# Patient Record
Sex: MALE | Race: WHITE | Employment: OTHER | URBAN - METROPOLITAN AREA
[De-identification: names, ages, dates, MRNs, and addresses within clinical notes are randomized per-mention and may not be internally consistent; named-entity substitution may affect disease eponyms.]

---

## 2018-03-28 ENCOUNTER — TRANSFERRED RECORDS (OUTPATIENT)
Dept: HEALTH INFORMATION MANAGEMENT | Facility: CLINIC | Age: 68
End: 2018-03-28

## 2018-04-19 ENCOUNTER — TRANSFERRED RECORDS (OUTPATIENT)
Dept: HEALTH INFORMATION MANAGEMENT | Facility: CLINIC | Age: 68
End: 2018-04-19

## 2018-05-22 ENCOUNTER — PRE VISIT (OUTPATIENT)
Dept: UROLOGY | Facility: CLINIC | Age: 68
End: 2018-05-22

## 2018-05-22 NOTE — TELEPHONE ENCOUNTER
MEDICAL RECORDS REQUEST   [facility]  Urology Clinic  [address] [place] [address]  PHONE: [phone]  Fax: [phone]        FUTURE VISIT INFORMATION                                                   Lasha Obando, : 1950 scheduled for future visit at [facility]    APPOINTMENT INFORMATION:    Date: 2018    Provider:  Matthew Brennan    Reason for Visit/Diagnosis: BPH    REFERRAL INFORMATION:    Referring provider:  KIMBERLEE MONROY    Specialty: PA    Referring providers clinic:  [facility]    Clinic contact number:  [phone]    RECORDS REQUESTED FOR VISIT                                                     NOTES  STATUS/DETAILS   OFFICE NOTE from referring provider  yes   OFFICE NOTE from other specialist  yes   DISCHARGE SUMMARY from hospital  no   DISCHARGE REPORT from the ER  no   OPERATIVE REPORT  no   MEDICATION LIST  yes       PRE-VISIT CHECKLIST      Record collection complete Yes  SENT TO SCANNING.. CDK   Appointment appropriately scheduled           (right time/right provider) Yes   Joint diagnostic appointment coordinated correctly          (ensure right order & amount of time) Yes   MyChart activation If no, please explain IN PROCESS   Questionnaire complete If no, please explain IN PROCESS     Completed by: Tracy Gauthier

## 2018-06-25 ENCOUNTER — PRE VISIT (OUTPATIENT)
Dept: UROLOGY | Facility: CLINIC | Age: 68
End: 2018-06-25

## 2018-06-26 ENCOUNTER — ALLIED HEALTH/NURSE VISIT (OUTPATIENT)
Dept: UROLOGY | Facility: CLINIC | Age: 68
End: 2018-06-26
Payer: COMMERCIAL

## 2018-06-26 ENCOUNTER — OFFICE VISIT (OUTPATIENT)
Dept: UROLOGY | Facility: CLINIC | Age: 68
End: 2018-06-26
Payer: COMMERCIAL

## 2018-06-26 VITALS
BODY MASS INDEX: 37.22 KG/M2 | DIASTOLIC BLOOD PRESSURE: 95 MMHG | WEIGHT: 290 LBS | HEART RATE: 63 BPM | SYSTOLIC BLOOD PRESSURE: 171 MMHG | HEIGHT: 74 IN

## 2018-06-26 DIAGNOSIS — N40.0 BENIGN PROSTATIC HYPERPLASIA, UNSPECIFIED WHETHER LOWER URINARY TRACT SYMPTOMS PRESENT: Primary | ICD-10-CM

## 2018-06-26 DIAGNOSIS — N40.0 BENIGN PROSTATIC HYPERPLASIA, UNSPECIFIED WHETHER LOWER URINARY TRACT SYMPTOMS PRESENT: ICD-10-CM

## 2018-06-26 LAB
ALBUMIN UR-MCNC: NEGATIVE MG/DL
ANION GAP SERPL CALCULATED.3IONS-SCNC: 8 MMOL/L (ref 3–14)
APPEARANCE UR: CLEAR
BILIRUB UR QL STRIP: NEGATIVE
BUN SERPL-MCNC: 17 MG/DL (ref 7–30)
CALCIUM SERPL-MCNC: 8.7 MG/DL (ref 8.5–10.1)
CHLORIDE SERPL-SCNC: 105 MMOL/L (ref 94–109)
CO2 SERPL-SCNC: 24 MMOL/L (ref 20–32)
COLOR UR AUTO: YELLOW
CREAT SERPL-MCNC: 0.94 MG/DL (ref 0.66–1.25)
ERYTHROCYTE [DISTWIDTH] IN BLOOD BY AUTOMATED COUNT: 12.1 % (ref 10–15)
GFR SERPL CREATININE-BSD FRML MDRD: 80 ML/MIN/1.7M2
GLUCOSE SERPL-MCNC: 96 MG/DL (ref 70–99)
GLUCOSE UR STRIP-MCNC: NEGATIVE MG/DL
HCT VFR BLD AUTO: 46.9 % (ref 40–53)
HGB BLD-MCNC: 15.4 G/DL (ref 13.3–17.7)
HGB UR QL STRIP: NEGATIVE
KETONES UR STRIP-MCNC: NEGATIVE MG/DL
LEUKOCYTE ESTERASE UR QL STRIP: ABNORMAL
MCH RBC QN AUTO: 29.5 PG (ref 26.5–33)
MCHC RBC AUTO-ENTMCNC: 32.8 G/DL (ref 31.5–36.5)
MCV RBC AUTO: 90 FL (ref 78–100)
MUCOUS THREADS #/AREA URNS LPF: PRESENT /LPF
NITRATE UR QL: NEGATIVE
PH UR STRIP: 6 PH (ref 5–7)
PLATELET # BLD AUTO: 204 10E9/L (ref 150–450)
POTASSIUM SERPL-SCNC: 4.3 MMOL/L (ref 3.4–5.3)
RBC # BLD AUTO: 5.22 10E12/L (ref 4.4–5.9)
RBC #/AREA URNS AUTO: 2 /HPF (ref 0–2)
SODIUM SERPL-SCNC: 137 MMOL/L (ref 133–144)
SOURCE: ABNORMAL
SP GR UR STRIP: 1.01 (ref 1–1.03)
SQUAMOUS #/AREA URNS AUTO: 1 /HPF (ref 0–1)
UROBILINOGEN UR STRIP-MCNC: 0 MG/DL (ref 0–2)
WBC # BLD AUTO: 8 10E9/L (ref 4–11)
WBC #/AREA URNS AUTO: 22 /HPF (ref 0–5)

## 2018-06-26 ASSESSMENT — ENCOUNTER SYMPTOMS
FLANK PAIN: 0
DYSURIA: 1
HEMATURIA: 1
DIFFICULTY URINATING: 0

## 2018-06-26 ASSESSMENT — PAIN SCALES - GENERAL: PAINLEVEL: NO PAIN (0)

## 2018-06-26 NOTE — PROGRESS NOTES
Service Date: 06/26/2018      REASON FOR VISIT TODAY:  Benign prostatic hypertrophy.      HISTORY OF PRESENT ILLNESS:  Mr. Obando is a 68-year-old gentleman who [facility].  The patient developed some flank pain on 03/28/2018 and was ultimately seen in the ER.  Imaging obtained at that time demonstrated a markedly enlarged bladder with the dome of the bladder reaching the hilar vessels.  The patient had bilateral hydronephrosis noted as well.  The patient was started on Flomax at that time and began clean intermittent catheterization.  He has been performing intermittent catheterization now 3 times daily since then and has been doing so without difficulty.  The patient notes that he initially was having postvoid residuals around 500 mL has not checked the volumes recently but notes he continues to have significant amounts of urine when he catheterizes.        Prior to the patient's presentation in the ER, the patient did have to push and strain to empty his bladder.  He did have sensations of incomplete emptying as well as significant urge and urge incontinence at times.  The patient had tried CIC in the past for a small period of time but had difficulty with it at that time and then stopped.  The patient notes he had not tried any medications for his urination prior to taking Flomax for 1 month after his visit to the ER in March.  He is not currently taking any medications for urination.      PAST MEDICAL HISTORY:  Significant for an episode of CHF, hypertension, gout, umbilical hernia.      PAST SURGICAL HISTORY:  Includes a left inguinal hernia repair and ear surgeries.      MEDICATIONS:  Carvedilol, losartan, hydralazine.      ALLERGIES:  No known drug allergies.      FAMILY HISTORY:  Negative for urologic malignancies.      SOCIAL HISTORY:  The patient smoked tobacco for 12 years and quit in 1992.  No alcohol.      REVIEW OF SYSTEMS:  Negative for  fevers, chills, sweats, nausea, vomiting, unexplained weight changes.      PHYSICAL EXAMINATION:   VITAL SIGNS:  Blood pressure is 171/95, pulse 63.   GENERAL:  He is in no acute distress.   ABDOMEN:  Obese with a moderately large umbilical hernia protruding out which is nontender.  There is no flank tenderness to percussion.   GENITALIA:  He has a circumcised phallus, normal meatus.  Testes are descended bilaterally.  The right testicle is somewhat larger than the left, consistent with a hydrocele.  There were no palpable abnormalities in the testes.  No evidence of inguinal hernias.   RECTAL:  Digital rectal exam was limited by body habitus, but the apex of the prostate was smooth and without nodularity.      The patient has a CT scan as noted above from 03/28/2018.  Followup ultrasound on 04/19/2018 showing no hydronephrosis, having resolved after the patient began doing CIC.  The patient's PSAs include a value of 4.12 on 01/20/2015, 4.34 on 08/07/2015, 6.04 on 01/11/2016 and 5.07 on 05/14/2018.      ASSESSMENT AND PLAN:  Over half of today's 45-minute visit was spent counseling the patient regarding his BPH and lower urinary tract symptoms.  I suggested to Mr. Obando that at this point in time he would have options including continued observation with CIC versus starting medications such as Flomax and finasteride which would actually shrink the prostate after taking the medication for 6 months or considering surgical intervention in the form of either a transurethral resection of the prostate or some version such as GreenLight laser photovaporization of the prostate versus simple prostatectomy versus HoLEP.  The risks and benefits of the various procedures were discussed in detail with Mr. Obando with an emphasis on a HoLEP procedure given the large size of the patient's prostate.  We discussed risks of the procedure including urinary incontinence, erectile dysfunction, development of urgency, frequency as well as  possible bladder neck contractures following the procedure.  The patient notes that he thinks at this point in time he would like to go forward with the HoLEP procedure.  I have had the patient meet with Dr. Lawrence today in clinic as well as Dr. Lawrence would likely be involved in the patient's procedure as well.  Mr. Obando and his wife asked many good questions today and these were answered to their satisfaction.  We look forward to seeing the patient in the operating room in the near future.         DEJUAN PORTER MD             D: 2018   T: 2018   MT: alee      Name:     JESUS OBANDO   MRN:      [id number]        Account:      [id number]   :      1950           Service Date: 2018      Document: [id number]

## 2018-06-26 NOTE — LETTER
6/26/2018     RE: Lasha Obando  [address] [place] [address]     Dear Colleague,    Thank you for referring your patient, Lasha Obando, to the [facility] UROLOGY AND INST FOR PROSTATE AND UROLOGIC CANCERS at [facility]. Please see a copy of my visit note below.    Service Date: 06/26/2018      REASON FOR VISIT TODAY:  Benign prostatic hypertrophy.      HISTORY OF PRESENT ILLNESS:  Mr. Obando is a 68-year-old gentleman who [facility].  The patient developed some flank pain on 03/28/2018 and was ultimately seen in the ER.  Imaging obtained at that time demonstrated a markedly enlarged bladder with the dome of the bladder reaching the hilar vessels.  The patient had bilateral hydronephrosis noted as well.  The patient was started on Flomax at that time and began clean intermittent catheterization.  He has been performing intermittent catheterization now 3 times daily since then and has been doing so without difficulty.  The patient notes that he initially was having postvoid residuals around 500 mL has not checked the volumes recently but notes he continues to have significant amounts of urine when he catheterizes.        Prior to the patient's presentation in the ER, the patient did have to push and strain to empty his bladder.  He did have sensations of incomplete emptying as well as significant urge and urge incontinence at times.  The patient had tried CIC in the past for a small period of time but had difficulty with it at that time and then stopped.  The patient notes he had not tried any medications for his urination prior to taking Flomax for 1 month after his visit to the ER in March.  He is not currently taking any medications for urination.      PAST MEDICAL HISTORY:  Significant for an episode of CHF, hypertension, gout, umbilical hernia.      PAST SURGICAL HISTORY:  Includes a left inguinal hernia  repair and ear surgeries.      MEDICATIONS:  Carvedilol, losartan, hydralazine.      ALLERGIES:  No known drug allergies.      FAMILY HISTORY:  Negative for urologic malignancies.      SOCIAL HISTORY:  The patient smoked tobacco for 12 years and quit in 1992.  No alcohol.      REVIEW OF SYSTEMS:  Negative for fevers, chills, sweats, nausea, vomiting, unexplained weight changes.      PHYSICAL EXAMINATION:   VITAL SIGNS:  Blood pressure is 171/95, pulse 63.   GENERAL:  He is in no acute distress.   ABDOMEN:  Obese with a moderately large umbilical hernia protruding out which is nontender.  There is no flank tenderness to percussion.   GENITALIA:  He has a circumcised phallus, normal meatus.  Testes are descended bilaterally.  The right testicle is somewhat larger than the left, consistent with a hydrocele.  There were no palpable abnormalities in the testes.  No evidence of inguinal hernias.   RECTAL:  Digital rectal exam was limited by body habitus, but the apex of the prostate was smooth and without nodularity.      The patient has a CT scan as noted above from 03/28/2018.  Followup ultrasound on 04/19/2018 showing no hydronephrosis, having resolved after the patient began doing CIC.  The patient's PSAs include a value of 4.12 on 01/20/2015, 4.34 on 08/07/2015, 6.04 on 01/11/2016 and 5.07 on 05/14/2018.      ASSESSMENT AND PLAN:  Over half of today's 45-minute visit was spent counseling the patient regarding his BPH and lower urinary tract symptoms.  I suggested to Mr. Obando that at this point in time he would have options including continued observation with CIC versus starting medications such as Flomax and finasteride which would actually shrink the prostate after taking the medication for 6 months or considering surgical intervention in the form of either a transurethral resection of the prostate or some version such as GreenLight laser photovaporization of the prostate versus simple prostatectomy versus HoLEP.   The risks and benefits of the various procedures were discussed in detail with Mr. Obando with an emphasis on a HoLEP procedure given the large size of the patient's prostate.  We discussed risks of the procedure including urinary incontinence, erectile dysfunction, development of urgency, frequency as well as possible bladder neck contractures following the procedure.  The patient notes that he thinks at this point in time he would like to go forward with the HoLEP procedure.  I have had the patient meet with Dr. Lawrence today in clinic as well as Dr. Lawrence would likely be involved in the patient's procedure as well.  Mr. Obando and his wife asked many good questions today and these were answered to their satisfaction.  We look forward to seeing the patient in the operating room in the near future.         DEJUAN PORTER MD

## 2018-06-26 NOTE — NURSING NOTE
Pre Op Teaching Flowsheet       Pre and Post op Patient Education  Relevant Diagnosis:  BPH      Motivation Level:  Asks Questions: Yes  Eager to Learn:  Yes  Cooperative: Yes  Receptive (willing/able to accept information):  Yes  Patient demonstrates understanding of the following:  Date and time of surgery:  July 12th  [facility]  History and Physical and any other testing necessary prior to surgery: [facility]  Required time line for completion of History and Physical and any pre-op testing: Yes    NPO Guidelines: Nothing to eat 8 hours prior to surgery. Can have clear liquids up to 2 hours prior to sugery    Patient demonstrates understanding of the following:  Pre-op bowel prep: N/A  Pre-op showering/scrub information with Hibiclens Soap: Yes  Medications to take the day of surgery:  Per PCP  Blood thinner medications discussed and when to stop (if applicable):  Yes  Diabetes medication management (if applicable):  N/A  Discussed pain control after surgery: pain scale, pain medications and pain management techniques  Infection Prevention: Patient demonstrates understanding of the following:  Patient instructed on hand hygiene:  Yes  Surgical procedure site care taught: N/A  Signs and symptoms of infection taught:  Yes  Wound care will be taught at the time of discharge.  Central venous catheter care will be taught at the time of discharge (if applicable).    Post-op follow-up:  Discussed how to contact the hospital, nurse, and clinic scheduling staff if necessary.    Instructional materials used/given/mailed:  [facility] Booklet, post op teaching sheet, Map, Soap, and arrival/location information.    Surgical instructions given to patient in clinic: Yes.    Instructional Materials given:  Before your surgery packet , Medications to avoid before surgery , Showering or Bathing instructions before surgery  and What to expect after surgery  Total time  with patient: 10 minutes    Corrine James RN

## 2018-06-26 NOTE — MR AVS SNAPSHOT
After Visit Summary   6/26/2018    Lasha Obando    MRN: [id number]           Patient Information     Date Of Birth          1950        Visit Information        Provider Department      6/26/2018 12:00 PM Matthew Brennan MD [facility] and [facility]rologic [facility]        Today's Diagnoses     Benign prostatic hyperplasia, unspecified whether lower urinary tract symptoms present    -  1       Follow-ups after your visit        Additional Services     PAC Visit Referral (For [facility] Only)       Does this visit require an Anesthesia consult?  Yes - Evaluate for medical necessity related to one of the following conditions:  Elevated cardiopulmonary risk    H&P done by:  PCP      Please be aware that coverage of these services is subject to the terms and limitations of your health insurance plan.  Call member services at your health plan with any benefit or coverage questions.      Please bring the following to your appointment:  >>   Any x-rays, CTs or MRIs which have been performed.  Contact the facility where they were done to arrange for  prior to your scheduled appointment.  Any new CT, MRI or other procedures ordered by your specialist must be performed at a [place] facility or coordinated by your clinic's referral office.    >>   List of current medications  >>   This referral request   >>   Any documents/labs given to you for this referral                  Your next 10 appointments already scheduled     Jul 12, 2018   Procedure with [place] Lawrence MD   [facility], [place], Same Day Surgery (--)    [address] [address] [phone]              Who to contact     Please call your clinic at [phone] to:    Ask questions about your health    Make or cancel appointments    Discuss your medicines    Learn about your test results    Speak to your doctor            Additional Information About Your Visit        MyChart Information      "[employer] gives you secure access to your electronic health record. If you see a primary care provider, you can also send messages to your care team and make appointments. If you have questions, please call your primary care clinic.  If you do not have a primary care provider, please call [phone] and they will assist you.      [employer] is an electronic gateway that provides easy, online access to your medical records. With [employer], you can request a clinic appointment, read your test results, renew a prescription or communicate with your care team.     To access your existing account, please contact your [facility] or call [phone] for assistance.        Care EveryWhere ID     This is your Care EveryWhere ID. This could be used by other organizations to access your [place] medical records  [id number]        Your Vitals Were     Pulse Height BMI (Body Mass Index)             63 1.88 m (6' 2\") 37.23 kg/m2          Blood Pressure from Last 3 Encounters:   No data found for BP    Weight from Last 3 Encounters:   No data found for Wt              Today, you had the following     No orders found for display       Primary Care Provider    None Specified       No primary provider on file.        Equal Access to Services     [facility] Center[facility] : Hadii sukh Love, sherron saucedo, gautam yee, lupe sow . So [facility] [phone].    ATENCIÓN: Si habla español, tiene a hunt disposición servicios gratuitos de asistencia lingüística. Llame al [phone].    We comply with applicable federal civil rights laws and [place] laws. We do not discriminate on the basis of race, color, national origin, age, disability, sex, sexual orientation, or gender identity.            Thank you!     Thank you for choosing [facility] UROLOGY AND [facility] FOR PROSTATE AND UROLOGIC CANCERS  for your care. Our goal is always to provide you with excellent care. Hearing " back from our patients is one way we can continue to improve our services. Please take a few minutes to complete the written survey that you may receive in the mail after your visit with us. Thank you!             Your Updated Medication List - Protect others around you: Learn how to safely use, store and throw away your medicines at www.disposemymeds.org.      Notice  As of 6/26/2018 11:59 PM    You have not been prescribed any medications.

## 2018-06-26 NOTE — MR AVS SNAPSHOT
After Visit Summary   6/26/2018    Lasha Obando    MRN: [id number]           Patient Information     Date Of Birth          1950        Visit Information        Provider Department      6/26/2018 2:00 PM Nurse, Jos[facility] and [facility] for Prostate and Urologic Cancers        Today's Diagnoses     Benign prostatic hyperplasia, unspecified whether lower urinary tract symptoms present    -  1       Follow-ups after your visit        Future tests that were ordered for you today     Open Future Orders        Priority Expected Expires Ordered    CBC with platelets Routine  6/26/2019 6/26/2018    Basic metabolic panel Routine  6/26/2019 6/26/2018            Who to contact     Please call your clinic at [phone] to:    Ask questions about your health    Make or cancel appointments    Discuss your medicines    Learn about your test results    Speak to your doctor            Additional Information About Your Visit        [employer] Information     [employer] gives you secure access to your electronic health record. If you see a primary care provider, you can also send messages to your care team and make appointments. If you have questions, please call your primary care clinic.  If you do not have a primary care provider, please call [phone] and they will assist you.      [employer] is an electronic gateway that provides easy, online access to your medical records. With [employer], you can request a clinic appointment, read your test results, renew a prescription or communicate with your care team.     To access your existing account, please contact your [facility] or call [phone] for assistance.        Care EveryWhere ID     This is your Care EveryWhere ID. This could be used by other organizations to access your [place] medical records  [id number]         Blood Pressure from Last 3 Encounters:   06/26/18 (!) 171/95    Weight from Last 3  Encounters:   06/26/18 131.5 kg (290 lb)              Today, you had the following     No orders found for display       Primary Care Provider    None Specified       No primary provider on file.        Equal Access to Services     PENNY GIORDANO : Hadii aad ku hadgueralucia Love, philipjac oropezalukasha, gautam yee, lupe angélicain hayaataryn joedavid giordano laWilbertdennis weller. So [facility] [phone].    ATENCIÓN: Si habla español, tiene a hunt disposición servicios gratuitos de asistencia lingüística. Llame al [phone].    We comply with applicable federal civil rights laws and [place] laws. We do not discriminate on the basis of race, color, national origin, age, disability, sex, sexual orientation, or gender identity.            Thank you!     Thank you for choosing [facility] UROLOGY AND [facility] FOR PROSTATE AND UROLOGIC CANCERS  for your care. Our goal is always to provide you with excellent care. Hearing back from our patients is one way we can continue to improve our services. Please take a few minutes to complete the written survey that you may receive in the mail after your visit with us. Thank you!             Your Updated Medication List - Protect others around you: Learn how to safely use, store and throw away your medicines at www.disposemymeds.org.      Notice  As of 6/26/2018  2:24 PM    You have not been prescribed any medications.

## 2018-06-28 LAB
BACTERIA SPEC CULT: ABNORMAL
Lab: ABNORMAL
SPECIMEN SOURCE: ABNORMAL

## 2018-07-03 ENCOUNTER — CARE COORDINATION (OUTPATIENT)
Dept: UROLOGY | Facility: CLINIC | Age: 68
End: 2018-07-03

## 2018-07-03 DIAGNOSIS — R30.0 DYSURIA: Primary | ICD-10-CM

## 2018-07-03 RX ORDER — CIPROFLOXACIN 500 MG/1
500 TABLET, FILM COATED ORAL 2 TIMES DAILY
Qty: 10 TABLET | Refills: 0 | Status: SHIPPED | OUTPATIENT
Start: 2018-07-03 | End: 2018-07-09

## 2018-07-03 NOTE — PROGRESS NOTES
Spoke with patient to let him know to start taking Cipro 5 days prior to upcoming surgery. RX sent to [facility]. Patient agrees with plan. Corrine James RN

## 2018-07-09 ENCOUNTER — TELEPHONE (OUTPATIENT)
Dept: UROLOGY | Facility: CLINIC | Age: 68
End: 2018-07-09

## 2018-07-09 DIAGNOSIS — R30.0 DYSURIA: Primary | ICD-10-CM

## 2018-07-09 RX ORDER — NITROFURANTOIN 25; 75 MG/1; MG/1
100 CAPSULE ORAL 2 TIMES DAILY
Qty: 10 CAPSULE | Refills: 0 | Status: SHIPPED | OUTPATIENT
Start: 2018-07-09 | End: 2018-07-10

## 2018-07-09 NOTE — TELEPHONE ENCOUNTER
Patient was started on Cipro on 7/7/18.  Patient states he took 1 dose and had some difficulty breathing and sweating.  He didn't take any other doses and he has returned to baseline.    Discussed with Dr Lawrence. Will switch to nitrofurantoin BID.    I attempted to update his allergies but he didn't have them available at the time of the call.    He will bring his full list to surgery on 7/12/18 to update his chart.    RX sent to patient's pharmacy.      Karina Martines, RN, BSN  Urology Patient Care Supervisor

## 2018-07-10 ENCOUNTER — CARE COORDINATION (OUTPATIENT)
Dept: UROLOGY | Facility: CLINIC | Age: 68
End: 2018-07-10

## 2018-07-10 DIAGNOSIS — R30.0 DYSURIA: Primary | ICD-10-CM

## 2018-07-10 RX ORDER — CETIRIZINE HYDROCHLORIDE 10 MG/1
10 TABLET ORAL DAILY
COMMUNITY
End: 2018-07-10

## 2018-07-10 RX ORDER — VALSARTAN 160 MG/1
160 TABLET ORAL 2 TIMES DAILY
COMMUNITY

## 2018-07-10 RX ORDER — DOXYCYCLINE 100 MG/1
100 CAPSULE ORAL 2 TIMES DAILY
Qty: 14 CAPSULE | Refills: 0 | Status: ON HOLD | OUTPATIENT
Start: 2018-07-10 | End: 2018-07-13

## 2018-07-10 RX ORDER — HYDRALAZINE HYDROCHLORIDE 25 MG/1
25 TABLET, FILM COATED ORAL 3 TIMES DAILY
COMMUNITY

## 2018-07-10 RX ORDER — CARVEDILOL 25 MG/1
25 TABLET ORAL 2 TIMES DAILY WITH MEALS
COMMUNITY

## 2018-07-10 NOTE — PROGRESS NOTES
Patient called me this morning to let me know that he also had a reaction to macrobid. Palpitations, SOB . I will list it as an allergy. Dr. Lawrence switched to doxycline 100mg BID. Patient notified and he will pick it up and start taking it today in preparation for his procedure this Thursday. Corrine James RN

## 2018-07-12 ENCOUNTER — ANESTHESIA (OUTPATIENT)
Dept: SURGERY | Facility: CLINIC | Age: 68
End: 2018-07-12
Payer: COMMERCIAL

## 2018-07-12 ENCOUNTER — SURGERY (OUTPATIENT)
Age: 68
End: 2018-07-12

## 2018-07-12 ENCOUNTER — HOSPITAL ENCOUNTER (OUTPATIENT)
Facility: CLINIC | Age: 68
Setting detail: OBSERVATION
Discharge: HOME | End: 2018-07-13
Attending: UROLOGY | Admitting: UROLOGY
Payer: COMMERCIAL

## 2018-07-12 ENCOUNTER — ANESTHESIA EVENT (OUTPATIENT)
Dept: SURGERY | Facility: CLINIC | Age: 68
End: 2018-07-12
Payer: COMMERCIAL

## 2018-07-12 DIAGNOSIS — G89.18 POST-OP PAIN: Primary | ICD-10-CM

## 2018-07-12 DIAGNOSIS — R30.0 DYSURIA: ICD-10-CM

## 2018-07-12 PROBLEM — N40.1 PROSTATE HYPERPLASIA WITH URINARY OBSTRUCTION: Status: ACTIVE | Noted: 2018-07-12

## 2018-07-12 PROBLEM — N13.8 PROSTATE HYPERPLASIA WITH URINARY OBSTRUCTION: Status: ACTIVE | Noted: 2018-07-12

## 2018-07-12 LAB
ABO + RH BLD: NORMAL
ABO + RH BLD: NORMAL
BLD GP AB SCN SERPL QL: NORMAL
BLOOD BANK CMNT PATIENT-IMP: NORMAL
ERYTHROCYTE [DISTWIDTH] IN BLOOD BY AUTOMATED COUNT: 12.5 % (ref 10–15)
GLUCOSE BLDC GLUCOMTR-MCNC: 119 MG/DL (ref 70–99)
GLUCOSE SERPL-MCNC: 116 MG/DL (ref 70–99)
HCT VFR BLD AUTO: 46.6 % (ref 40–53)
HGB BLD-MCNC: 15.1 G/DL (ref 13.3–17.7)
MCH RBC QN AUTO: 29.4 PG (ref 26.5–33)
MCHC RBC AUTO-ENTMCNC: 32.4 G/DL (ref 31.5–36.5)
MCV RBC AUTO: 91 FL (ref 78–100)
PLATELET # BLD AUTO: 171 10E9/L (ref 150–450)
POTASSIUM SERPL-SCNC: 4.4 MMOL/L (ref 3.4–5.3)
RBC # BLD AUTO: 5.14 10E12/L (ref 4.4–5.9)
SPECIMEN EXP DATE BLD: NORMAL
WBC # BLD AUTO: 7.6 10E9/L (ref 4–11)

## 2018-07-12 PROCEDURE — 84132 ASSAY OF SERUM POTASSIUM: CPT | Performed by: ANESTHESIOLOGY

## 2018-07-12 PROCEDURE — 82947 ASSAY GLUCOSE BLOOD QUANT: CPT | Performed by: ANESTHESIOLOGY

## 2018-07-12 PROCEDURE — 86850 RBC ANTIBODY SCREEN: CPT | Performed by: UROLOGY

## 2018-07-12 PROCEDURE — P9041 ALBUMIN (HUMAN),5%, 50ML: HCPCS | Performed by: NURSE ANESTHETIST, CERTIFIED REGISTERED

## 2018-07-12 PROCEDURE — 25000128 H RX IP 250 OP 636: Performed by: NURSE ANESTHETIST, CERTIFIED REGISTERED

## 2018-07-12 PROCEDURE — 25000566 ZZH SEVOFLURANE, EA 15 MIN: Performed by: UROLOGY

## 2018-07-12 PROCEDURE — 36415 COLL VENOUS BLD VENIPUNCTURE: CPT | Performed by: STUDENT IN AN ORGANIZED HEALTH CARE EDUCATION/TRAINING PROGRAM

## 2018-07-12 PROCEDURE — 36000064 ZZH SURGERY LEVEL 4 EA 15 ADDTL MIN - UMMC: Performed by: UROLOGY

## 2018-07-12 PROCEDURE — C9399 UNCLASSIFIED DRUGS OR BIOLOG: HCPCS | Performed by: NURSE ANESTHETIST, CERTIFIED REGISTERED

## 2018-07-12 PROCEDURE — 85027 COMPLETE CBC AUTOMATED: CPT | Performed by: STUDENT IN AN ORGANIZED HEALTH CARE EDUCATION/TRAINING PROGRAM

## 2018-07-12 PROCEDURE — 36000062 ZZH SURGERY LEVEL 4 1ST 30 MIN - UMMC: Performed by: UROLOGY

## 2018-07-12 PROCEDURE — 86900 BLOOD TYPING SEROLOGIC ABO: CPT | Performed by: UROLOGY

## 2018-07-12 PROCEDURE — 86901 BLOOD TYPING SEROLOGIC RH(D): CPT | Performed by: UROLOGY

## 2018-07-12 PROCEDURE — 71000014 ZZH RECOVERY PHASE 1 LEVEL 2 FIRST HR: Performed by: UROLOGY

## 2018-07-12 PROCEDURE — 25800025 ZZH RX 258: Performed by: STUDENT IN AN ORGANIZED HEALTH CARE EDUCATION/TRAINING PROGRAM

## 2018-07-12 PROCEDURE — 88305 TISSUE EXAM BY PATHOLOGIST: CPT | Mod: 26 | Performed by: UROLOGY

## 2018-07-12 PROCEDURE — 40000170 ZZH STATISTIC PRE-PROCEDURE ASSESSMENT II: Performed by: UROLOGY

## 2018-07-12 PROCEDURE — 25000125 ZZHC RX 250: Performed by: NURSE ANESTHETIST, CERTIFIED REGISTERED

## 2018-07-12 PROCEDURE — 00000146 ZZHCL STATISTIC GLUCOSE BY METER IP

## 2018-07-12 PROCEDURE — 88305 TISSUE EXAM BY PATHOLOGIST: CPT | Performed by: UROLOGY

## 2018-07-12 PROCEDURE — 37000008 ZZH ANESTHESIA TECHNICAL FEE, 1ST 30 MIN: Performed by: UROLOGY

## 2018-07-12 PROCEDURE — 27210794 ZZH OR GENERAL SUPPLY STERILE: Performed by: UROLOGY

## 2018-07-12 PROCEDURE — 25000128 H RX IP 250 OP 636: Performed by: UROLOGY

## 2018-07-12 PROCEDURE — 37000009 ZZH ANESTHESIA TECHNICAL FEE, EACH ADDTL 15 MIN: Performed by: UROLOGY

## 2018-07-12 PROCEDURE — 36415 COLL VENOUS BLD VENIPUNCTURE: CPT | Performed by: UROLOGY

## 2018-07-12 PROCEDURE — G0378 HOSPITAL OBSERVATION PER HR: HCPCS

## 2018-07-12 PROCEDURE — C1758 CATHETER, URETERAL: HCPCS | Performed by: UROLOGY

## 2018-07-12 RX ORDER — ALBUTEROL SULFATE 0.83 MG/ML
2.5 SOLUTION RESPIRATORY (INHALATION) EVERY 4 HOURS PRN
Status: DISCONTINUED | OUTPATIENT
Start: 2018-07-12 | End: 2018-07-12 | Stop reason: HOSPADM

## 2018-07-12 RX ORDER — ONDANSETRON 2 MG/ML
4 INJECTION INTRAMUSCULAR; INTRAVENOUS EVERY 30 MIN PRN
Status: DISCONTINUED | OUTPATIENT
Start: 2018-07-12 | End: 2018-07-12 | Stop reason: HOSPADM

## 2018-07-12 RX ORDER — CARVEDILOL 25 MG/1
25 TABLET ORAL 2 TIMES DAILY WITH MEALS
Status: DISCONTINUED | OUTPATIENT
Start: 2018-07-12 | End: 2018-07-13 | Stop reason: HOSPADM

## 2018-07-12 RX ORDER — EPHEDRINE SULFATE 50 MG/ML
INJECTION, SOLUTION INTRAMUSCULAR; INTRAVENOUS; SUBCUTANEOUS PRN
Status: DISCONTINUED | OUTPATIENT
Start: 2018-07-12 | End: 2018-07-12

## 2018-07-12 RX ORDER — FENTANYL CITRATE 50 UG/ML
INJECTION, SOLUTION INTRAMUSCULAR; INTRAVENOUS PRN
Status: DISCONTINUED | OUTPATIENT
Start: 2018-07-12 | End: 2018-07-12

## 2018-07-12 RX ORDER — SODIUM CHLORIDE, SODIUM LACTATE, POTASSIUM CHLORIDE, CALCIUM CHLORIDE 600; 310; 30; 20 MG/100ML; MG/100ML; MG/100ML; MG/100ML
INJECTION, SOLUTION INTRAVENOUS CONTINUOUS
Status: DISCONTINUED | OUTPATIENT
Start: 2018-07-12 | End: 2018-07-12 | Stop reason: HOSPADM

## 2018-07-12 RX ORDER — VANCOMYCIN HYDROCHLORIDE 1 G/200ML
1000 INJECTION, SOLUTION INTRAVENOUS EVERY 12 HOURS
Status: DISCONTINUED | OUTPATIENT
Start: 2018-07-12 | End: 2018-07-12 | Stop reason: HOSPADM

## 2018-07-12 RX ORDER — LIDOCAINE HYDROCHLORIDE 20 MG/ML
INJECTION, SOLUTION INFILTRATION; PERINEURAL PRN
Status: DISCONTINUED | OUTPATIENT
Start: 2018-07-12 | End: 2018-07-12

## 2018-07-12 RX ORDER — ONDANSETRON 2 MG/ML
INJECTION INTRAMUSCULAR; INTRAVENOUS PRN
Status: DISCONTINUED | OUTPATIENT
Start: 2018-07-12 | End: 2018-07-12

## 2018-07-12 RX ORDER — VANCOMYCIN HYDROCHLORIDE 1 G/200ML
1000 INJECTION, SOLUTION INTRAVENOUS EVERY 12 HOURS
Status: DISCONTINUED | OUTPATIENT
Start: 2018-07-12 | End: 2018-07-12 | Stop reason: DRUGHIGH

## 2018-07-12 RX ORDER — OXYCODONE HYDROCHLORIDE 5 MG/1
5 TABLET ORAL EVERY 6 HOURS PRN
Qty: 20 TABLET | Refills: 0 | Status: SHIPPED | OUTPATIENT
Start: 2018-07-12 | End: 2018-07-12

## 2018-07-12 RX ORDER — ALBUMIN, HUMAN INJ 5% 5 %
SOLUTION INTRAVENOUS CONTINUOUS PRN
Status: DISCONTINUED | OUTPATIENT
Start: 2018-07-12 | End: 2018-07-12

## 2018-07-12 RX ORDER — SODIUM CHLORIDE, SODIUM LACTATE, POTASSIUM CHLORIDE, CALCIUM CHLORIDE 600; 310; 30; 20 MG/100ML; MG/100ML; MG/100ML; MG/100ML
INJECTION, SOLUTION INTRAVENOUS CONTINUOUS PRN
Status: DISCONTINUED | OUTPATIENT
Start: 2018-07-12 | End: 2018-07-12

## 2018-07-12 RX ORDER — HYDROMORPHONE HYDROCHLORIDE 1 MG/ML
.3-.5 INJECTION, SOLUTION INTRAMUSCULAR; INTRAVENOUS; SUBCUTANEOUS EVERY 10 MIN PRN
Status: DISCONTINUED | OUTPATIENT
Start: 2018-07-12 | End: 2018-07-12 | Stop reason: HOSPADM

## 2018-07-12 RX ORDER — NALOXONE HYDROCHLORIDE 0.4 MG/ML
.1-.4 INJECTION, SOLUTION INTRAMUSCULAR; INTRAVENOUS; SUBCUTANEOUS
Status: DISCONTINUED | OUTPATIENT
Start: 2018-07-12 | End: 2018-07-13 | Stop reason: HOSPADM

## 2018-07-12 RX ORDER — NALOXONE HYDROCHLORIDE 0.4 MG/ML
.1-.4 INJECTION, SOLUTION INTRAMUSCULAR; INTRAVENOUS; SUBCUTANEOUS
Status: DISCONTINUED | OUTPATIENT
Start: 2018-07-12 | End: 2018-07-12 | Stop reason: HOSPADM

## 2018-07-12 RX ORDER — FENTANYL CITRATE 50 UG/ML
25-50 INJECTION, SOLUTION INTRAMUSCULAR; INTRAVENOUS
Status: DISCONTINUED | OUTPATIENT
Start: 2018-07-12 | End: 2018-07-12 | Stop reason: HOSPADM

## 2018-07-12 RX ORDER — ONDANSETRON 4 MG/1
4 TABLET, ORALLY DISINTEGRATING ORAL EVERY 6 HOURS PRN
Status: DISCONTINUED | OUTPATIENT
Start: 2018-07-12 | End: 2018-07-13 | Stop reason: HOSPADM

## 2018-07-12 RX ORDER — OXYCODONE HYDROCHLORIDE 5 MG/1
5-10 TABLET ORAL
Status: DISCONTINUED | OUTPATIENT
Start: 2018-07-12 | End: 2018-07-13 | Stop reason: HOSPADM

## 2018-07-12 RX ORDER — DEXAMETHASONE SODIUM PHOSPHATE 4 MG/ML
4 INJECTION, SOLUTION INTRA-ARTICULAR; INTRALESIONAL; INTRAMUSCULAR; INTRAVENOUS; SOFT TISSUE EVERY 10 MIN PRN
Status: DISCONTINUED | OUTPATIENT
Start: 2018-07-12 | End: 2018-07-12 | Stop reason: HOSPADM

## 2018-07-12 RX ORDER — HYDROMORPHONE HCL/0.9% NACL/PF 0.2MG/0.2
0.2 SYRINGE (ML) INTRAVENOUS
Status: DISCONTINUED | OUTPATIENT
Start: 2018-07-12 | End: 2018-07-13 | Stop reason: HOSPADM

## 2018-07-12 RX ORDER — OXYCODONE HYDROCHLORIDE 5 MG/1
10 TABLET ORAL EVERY 4 HOURS PRN
Status: DISCONTINUED | OUTPATIENT
Start: 2018-07-12 | End: 2018-07-12

## 2018-07-12 RX ORDER — OXYCODONE HYDROCHLORIDE 5 MG/1
5-10 TABLET ORAL
Qty: 20 TABLET | Refills: 0 | Status: SHIPPED | OUTPATIENT
Start: 2018-07-12 | End: 2018-07-12

## 2018-07-12 RX ORDER — DEXAMETHASONE SODIUM PHOSPHATE 4 MG/ML
INJECTION, SOLUTION INTRA-ARTICULAR; INTRALESIONAL; INTRAMUSCULAR; INTRAVENOUS; SOFT TISSUE PRN
Status: DISCONTINUED | OUTPATIENT
Start: 2018-07-12 | End: 2018-07-12

## 2018-07-12 RX ORDER — HYDRALAZINE HYDROCHLORIDE 20 MG/ML
2.5-5 INJECTION INTRAMUSCULAR; INTRAVENOUS EVERY 10 MIN PRN
Status: DISCONTINUED | OUTPATIENT
Start: 2018-07-12 | End: 2018-07-12 | Stop reason: HOSPADM

## 2018-07-12 RX ORDER — OXYCODONE HYDROCHLORIDE 5 MG/1
5-10 TABLET ORAL
Qty: 20 TABLET | Refills: 0 | Status: SHIPPED | OUTPATIENT
Start: 2018-07-12 | End: 2018-09-11

## 2018-07-12 RX ORDER — PROPOFOL 10 MG/ML
INJECTION, EMULSION INTRAVENOUS PRN
Status: DISCONTINUED | OUTPATIENT
Start: 2018-07-12 | End: 2018-07-12

## 2018-07-12 RX ORDER — FUROSEMIDE 10 MG/ML
INJECTION INTRAMUSCULAR; INTRAVENOUS PRN
Status: DISCONTINUED | OUTPATIENT
Start: 2018-07-12 | End: 2018-07-12

## 2018-07-12 RX ORDER — METOPROLOL TARTRATE 1 MG/ML
1-2 INJECTION, SOLUTION INTRAVENOUS EVERY 5 MIN PRN
Status: DISCONTINUED | OUTPATIENT
Start: 2018-07-12 | End: 2018-07-12 | Stop reason: HOSPADM

## 2018-07-12 RX ORDER — ONDANSETRON 4 MG/1
4 TABLET, ORALLY DISINTEGRATING ORAL EVERY 30 MIN PRN
Status: DISCONTINUED | OUTPATIENT
Start: 2018-07-12 | End: 2018-07-12 | Stop reason: HOSPADM

## 2018-07-12 RX ORDER — PHYSOSTIGMINE SALICYLATE 1 MG/ML
1.2 INJECTION INTRAVENOUS
Status: DISCONTINUED | OUTPATIENT
Start: 2018-07-12 | End: 2018-07-12 | Stop reason: HOSPADM

## 2018-07-12 RX ORDER — PROCHLORPERAZINE MALEATE 5 MG
5 TABLET ORAL EVERY 6 HOURS PRN
Status: DISCONTINUED | OUTPATIENT
Start: 2018-07-12 | End: 2018-07-13 | Stop reason: HOSPADM

## 2018-07-12 RX ORDER — MEPERIDINE HYDROCHLORIDE 50 MG/ML
12.5 INJECTION INTRAMUSCULAR; INTRAVENOUS; SUBCUTANEOUS
Status: DISCONTINUED | OUTPATIENT
Start: 2018-07-12 | End: 2018-07-12 | Stop reason: HOSPADM

## 2018-07-12 RX ORDER — ONDANSETRON 2 MG/ML
4 INJECTION INTRAMUSCULAR; INTRAVENOUS EVERY 6 HOURS PRN
Status: DISCONTINUED | OUTPATIENT
Start: 2018-07-12 | End: 2018-07-13 | Stop reason: HOSPADM

## 2018-07-12 RX ORDER — LIDOCAINE 40 MG/G
CREAM TOPICAL
Status: DISCONTINUED | OUTPATIENT
Start: 2018-07-12 | End: 2018-07-13 | Stop reason: HOSPADM

## 2018-07-12 RX ADMIN — PHENYLEPHRINE HYDROCHLORIDE 100 MCG: 10 INJECTION, SOLUTION INTRAMUSCULAR; INTRAVENOUS; SUBCUTANEOUS at 13:39

## 2018-07-12 RX ADMIN — MIDAZOLAM 2 MG: 1 INJECTION INTRAMUSCULAR; INTRAVENOUS at 12:56

## 2018-07-12 RX ADMIN — PHENYLEPHRINE HYDROCHLORIDE 100 MCG: 10 INJECTION, SOLUTION INTRAMUSCULAR; INTRAVENOUS; SUBCUTANEOUS at 14:00

## 2018-07-12 RX ADMIN — PROPOFOL 200 MG: 10 INJECTION, EMULSION INTRAVENOUS at 13:03

## 2018-07-12 RX ADMIN — FENTANYL CITRATE 25 MCG: 50 INJECTION, SOLUTION INTRAMUSCULAR; INTRAVENOUS at 13:03

## 2018-07-12 RX ADMIN — PHENYLEPHRINE HYDROCHLORIDE 0.3 MCG/KG/MIN: 10 INJECTION, SOLUTION INTRAMUSCULAR; INTRAVENOUS; SUBCUTANEOUS at 13:57

## 2018-07-12 RX ADMIN — PROPOFOL 20 MG: 10 INJECTION, EMULSION INTRAVENOUS at 14:44

## 2018-07-12 RX ADMIN — PHENYLEPHRINE HYDROCHLORIDE 100 MCG: 10 INJECTION, SOLUTION INTRAMUSCULAR; INTRAVENOUS; SUBCUTANEOUS at 13:21

## 2018-07-12 RX ADMIN — SODIUM CHLORIDE 3000 ML: 900 IRRIGANT IRRIGATION at 18:06

## 2018-07-12 RX ADMIN — Medication 10 MG: at 13:29

## 2018-07-12 RX ADMIN — ROCURONIUM BROMIDE 50 MG: 10 INJECTION INTRAVENOUS at 13:53

## 2018-07-12 RX ADMIN — ONDANSETRON 4 MG: 2 INJECTION INTRAMUSCULAR; INTRAVENOUS at 15:08

## 2018-07-12 RX ADMIN — PHENYLEPHRINE HYDROCHLORIDE 100 MCG: 10 INJECTION, SOLUTION INTRAMUSCULAR; INTRAVENOUS; SUBCUTANEOUS at 13:47

## 2018-07-12 RX ADMIN — SODIUM CHLORIDE, POTASSIUM CHLORIDE, SODIUM LACTATE AND CALCIUM CHLORIDE: 600; 310; 30; 20 INJECTION, SOLUTION INTRAVENOUS at 14:17

## 2018-07-12 RX ADMIN — FUROSEMIDE 20 MG: 10 INJECTION, SOLUTION INTRAVENOUS at 14:54

## 2018-07-12 RX ADMIN — DEXAMETHASONE SODIUM PHOSPHATE 4 MG: 4 INJECTION, SOLUTION INTRAMUSCULAR; INTRAVENOUS at 13:43

## 2018-07-12 RX ADMIN — ROCURONIUM BROMIDE 20 MG: 10 INJECTION INTRAVENOUS at 14:16

## 2018-07-12 RX ADMIN — Medication 140 MG: at 13:03

## 2018-07-12 RX ADMIN — GENTAMICIN SULFATE 400 MG: 40 INJECTION, SOLUTION INTRAMUSCULAR; INTRAVENOUS at 13:20

## 2018-07-12 RX ADMIN — SODIUM CHLORIDE 3000 ML: 900 IRRIGANT IRRIGATION at 19:01

## 2018-07-12 RX ADMIN — SUGAMMADEX 200 MG: 100 INJECTION, SOLUTION INTRAVENOUS at 15:13

## 2018-07-12 RX ADMIN — Medication 5 MG: at 14:00

## 2018-07-12 RX ADMIN — ALBUMIN HUMAN: 0.05 INJECTION, SOLUTION INTRAVENOUS at 13:33

## 2018-07-12 RX ADMIN — SODIUM CHLORIDE, POTASSIUM CHLORIDE, SODIUM LACTATE AND CALCIUM CHLORIDE: 600; 310; 30; 20 INJECTION, SOLUTION INTRAVENOUS at 12:56

## 2018-07-12 RX ADMIN — PROPOFOL 30 MG: 10 INJECTION, EMULSION INTRAVENOUS at 13:53

## 2018-07-12 RX ADMIN — ROCURONIUM BROMIDE 20 MG: 10 INJECTION INTRAVENOUS at 14:44

## 2018-07-12 RX ADMIN — LIDOCAINE HYDROCHLORIDE 100 MG: 20 INJECTION, SOLUTION INFILTRATION; PERINEURAL at 13:03

## 2018-07-12 RX ADMIN — Medication 5 MG: at 13:21

## 2018-07-12 RX ADMIN — VANCOMYCIN HYDROCHLORIDE 1000 MG: 1 INJECTION, SOLUTION INTRAVENOUS at 13:23

## 2018-07-12 RX ADMIN — PHENYLEPHRINE HYDROCHLORIDE 100 MCG: 10 INJECTION, SOLUTION INTRAMUSCULAR; INTRAVENOUS; SUBCUTANEOUS at 13:33

## 2018-07-12 ASSESSMENT — LIFESTYLE VARIABLES: TOBACCO_USE: 1

## 2018-07-12 ASSESSMENT — NEW YORK HEART ASSOCIATION (NYHA) CLASSIFICATION: NYHA FUNCTIONAL CLASS: II

## 2018-07-12 NOTE — IP AVS SNAPSHOT
MRN:[id number]                      After Visit Summary   7/12/2018    Lasha Obando    MRN: [id number]           Thank you!     Thank you for choosing [facility] for your care. Our goal is always to provide you with excellent care. Hearing back from our patients is one way we can continue to improve our services. Please take a few minutes to complete the written survey that you may receive in the mail after you visit with us. Thank you!        Patient Information     Date Of Birth          1950        Designated Caregiver       Most Recent Value    Caregiver    Will someone help with your care after discharge? no      About your hospital stay     You were admitted on:  July 12, 2018 You last received care in the:  [facility] [address]    You were discharged on:  July 13, 2018        Reason for your hospital stay       Prostate surgery                  Who to Call     For medical emergencies, please call 911.  For non-urgent questions about your medical care, please call your primary care provider or clinic, [phone]  For questions related to your surgery, please call your surgery clinic        Attending Provider     Provider Specialty    Chico Lawrence MD Urology       Primary Care Provider Office Phone # Fax #    Tosha GarciaELIN marie [phone]      After Care Instructions     Activity       Your activity upon discharge: See discharge instructions            Diet       Follow this diet upon discharge: See discharge instructions            Discharge Instructions       Activity  - No strenuous exercise for 6 weeks.  - No lifting, pushing, pulling more than 10 pounds for 6 weeks.   - Do not strain with bowel movements.  - Do not drive until you can press the brake pedal quickly and fully without pain.   - Do not operate a motor vehicle while taking narcotic pain medications.     Urination  Catheter removed prior to discharge  - Some light redness (up to a watermelon-like-pink in  "color) is expected in the upcoming few weeks  - If having hematuria (blood in the urine), make sure to increase your water intake and monitor for improvement  - If you are unable to urinate you should return urgently to the ED or call clinic to try to arrange for an urgent visit same day.     Medications  - Transition from narcotic pain medications to tylenol (acetaminophen) as you are able.  Wean yourself off all pain medications as you are able.  - Some pain medications contain both tylenol (acetaminophen) and a narcotic (Norco, vicodin, percocet), do not take more than 4,000mg of Tylenol (acetaminophen) from all sources in any 24 hour period.  - Narcotics can make you constipated.  Take over the counter fiber (metamucil or benefiber) and stool softeners (miralax, docusate or senna) while taking narcotic pain medications, but stop if you develop diarrhea.  - No driving or operating machinery while taking narcotic pain medications     Follow-Up:  - Follow up with Dr. Lawrence will be scheduled  - Call your primary care provider to touch base regarding your recent admission.    - Call or return sooner than your regularly scheduled visit if you develop any of the following: fever (greater than 101.5), uncontrolled pain, uncontrolled nausea or vomiting, as well as increased redness, swelling, or drainage from your wound.     Phone numbers:   - Monday through Friday 8am to 4:30pm: Call [phone] with questions or to schedule or confirm appointment.    - Nights or weekends: call the after hours emergency pager - [phone] and tell the  \"I would like to page the Urology Resident on call.\"  - For emergencies, call 911                  Pending Results     Date and Time Order Name Status Description    7/12/2018 1524 Surgical pathology exam In process             Statement of Approval     Ordered          07/13/18 1421  I have reviewed and agree with all the recommendations and orders detailed in this " "document.  EFFECTIVE NOW     Approved and electronically signed by:  Boo Guzman MD             Admission Information     Date & Time Provider Department Dept. Phone    7/12/2018 Chico Lawrence MD [facility] [phone]      Your Vitals Were     Blood Pressure Pulse Temperature Respirations Height Weight    133/67 (BP Location: Right arm) 77 97.6  F (36.4  C) (Oral) 16 1.854 m (6' 1\") 132.9 kg (292 lb 15.9 oz)    Pulse Oximetry BMI (Body Mass Index)                96% 38.66 kg/m2          MyChart Information     [facility] gives you secure access to your electronic health record. If you see a primary care provider, you can also send messages to your care team and make appointments. If you have questions, please call your primary care clinic.  If you do not have a primary care provider, please call [phone] and they will assist you.        Care EveryWhere ID     This is your Care EveryWhere ID. This could be used by other organizations to access your [facility] medical records  [id number]        Equal Access to Services     PENNY GIORDANO : Hadii sukh Love, waaxda ludarlene, qaybmeggan dorseyalwilfredo yee, lupe sow . So [facility] [phone].    ATENCIÓN: Si habla español, tiene a hunt disposición servicios gratuitos de asistencia lingüística. Llame al [phone].    We comply with applicable federal civil rights laws and [place] laws. We do not discriminate on the basis of race, color, national origin, age, disability, sex, sexual orientation, or gender identity.               Review of your medicines      START taking        Dose / Directions    oxyCODONE IR 5 MG tablet   Commonly known as:  ROXICODONE   Used for:  Post-op pain        Dose:  5-10 mg   Take 1-2 tablets (5-10 mg) by mouth every 3 hours as needed for other (Moderate to Severe Pain)   Quantity:  20 tablet   Refills:  0         CONTINUE these medicines which have NOT CHANGED        Dose / " Directions    carvedilol 25 MG tablet   Commonly known as:  COREG        Dose:  25 mg   Take 25 mg by mouth 2 times daily (with meals)   Refills:  0       doxycycline monohydrate 100 MG capsule   Used for:  Dysuria        Dose:  100 mg   Take 1 capsule (100 mg) by mouth 2 times daily   Quantity:  14 capsule   Refills:  0       hydrALAZINE 25 MG tablet   Commonly known as:  APRESOLINE        Dose:  25 mg   Take 25 mg by mouth 3 times daily   Refills:  0       valsartan 160 MG tablet   Commonly known as:  DIOVAN        Dose:  160 mg   Take 160 mg by mouth 2 times daily   Refills:  0            Where to get your medicines      These medications were sent to [facility] [place], [place] - [address]  [address] [address]     Phone:  [phone]     doxycycline monohydrate 100 MG capsule         Some of these will need a paper prescription and others can be bought over the counter. Ask your nurse if you have questions.     Bring a paper prescription for each of these medications     oxyCODONE IR 5 MG tablet                Protect others around you: Learn how to safely use, store and throw away your medicines at www.disposemymeds.org.        ANTIBIOTIC INSTRUCTION     You've Been Prescribed an Antibiotic - Now What?  Your healthcare team thinks that you or your loved one might have an infection. Some infections can be treated with antibiotics, which are powerful, life-saving drugs. Like all medications, antibiotics have side effects and should only be used when necessary. There are some important things you should know about your antibiotic treatment.      Your healthcare team may run tests before you start taking an antibiotic.    Your team may take samples (e.g., from your blood, urine or other areas) to run tests to look for bacteria. These test can be important to determine if you need an antibiotic at all and, if you do, which antibiotic will work best.      Within a few  days, your healthcare team might change or even stop your antibiotic.    Your team may start you on an antibiotic while they are working to find out what is making you sick.    Your team might change your antibiotic because test results show that a different antibiotic would be better to treat your infection.    In some cases, once your team has more information, they learn that you do not need an antibiotic at all. They may find out that you don't have an infection, or that the antibiotic you're taking won't work against your infection. For example, an infection caused by a virus can't be treated with antibiotics. Staying on an antibiotic when you don't need it is more likely to be harmful than helpful.      You may experience side effects from your antibiotic.    Like all medications, antibiotics have side effects. Some of these can be serious.    Let you healthcare team know if you have any known allergies when you are admitted to the hospital.    One significant side effect of nearly all antibiotics is the risk of severe and sometimes deadly diarrhea caused by Clostridium difficile (C. Difficile). This occurs when a person takes antibiotics because some good germs are destroyed. Antibiotic use allows C. diificile to take over, putting patients at high risk for this serious infection.    As a patient or caregiver, it is important to understand your or your loved one's antibiotic treatment. It is especially important for caregivers to speak up when patients can't speak for themselves. Here are some important questions to ask your healthcare team.    What infection is this antibiotic treating and how do you know I have that infection?    What side effects might occur from this antibiotic?    How long will I need to take this antibiotic?    Is it safe to take this antibiotic with other medications or supplements (e.g., vitamins) that I am taking?     Are there any special directions I need to know about taking this  antibiotic? For example, should I take it with food?    How will I be monitored to know whether my infection is responding to the antibiotic?    What tests may help to make sure the right antibiotic is prescribed for me?      Information provided by:  www.cdc.gov/getsmart  U.S. Department of Health and Human Services  Centers for disease Control and Prevention  National Center for Emerging and Zoonotic Infectious Diseases  Division of Healthcare Quality Promotion        Information about OPIOIDS     PRESCRIPTION OPIOIDS: WHAT YOU NEED TO KNOW   We gave you an opioid (narcotic) pain medicine. It is important to manage your pain, but opioids are not always the best choice. You should first try all the other options your care team gave you. Take this medicine for as short a time (and as few doses) as possible.     These medicines have risks:    DO NOT drive when on new or higher doses of pain medicine. These medicines can affect your alertness and reaction times, and you could be arrested for driving under the influence (DUI). If you need to use opioids long-term, talk to your care team about driving.    DO NOT operate heave machinery    DO NOT do any other dangerous activities while taking these medicines.     DO NOT drink any alcohol while taking these medicines.      If the opioid prescribed includes acetaminophen, DO NOT take with any other medicines that contain acetaminophen. Read all labels carefully. Look for the word  acetaminophen  or  Tylenol.  Ask your pharmacist if you have questions or are unsure.    You can get addicted to pain medicines, especially if you have a history of addiction (chemical, alcohol or substance dependence). Talk to your care team about ways to reduce this risk.    Store your pills in a secure place, locked if possible. We will not replace any lost or stolen medicine. If you don t finish your medicine, please throw away (dispose) as directed by your pharmacist. The [place] Pollution  Control Agency has more information about safe disposal: https://www.pca.Replaced by [facility].[place]/living-green/managing-unwanted-medications.     All opioids tend to cause constipation. Drink plenty of water and eat foods that have a lot of fiber, such as fruits, vegetables, prune juice, apple juice and high-fiber cereal. Take a laxative (Miralax, milk of magnesia, Colace, Senna) if you don t move your bowels at least every other day.              Medication List: This is a list of all your medications and when to take them. Check marks below indicate your daily home schedule. Keep this list as a reference.      Medications           Morning Afternoon Evening Bedtime As Needed    carvedilol 25 MG tablet   Commonly known as:  COREG   Take 25 mg by mouth 2 times daily (with meals)                                doxycycline monohydrate 100 MG capsule   Take 1 capsule (100 mg) by mouth 2 times daily                                hydrALAZINE 25 MG tablet   Commonly known as:  APRESOLINE   Take 25 mg by mouth 3 times daily                                oxyCODONE IR 5 MG tablet   Commonly known as:  ROXICODONE   Take 1-2 tablets (5-10 mg) by mouth every 3 hours as needed for other (Moderate to Severe Pain)                                valsartan 160 MG tablet   Commonly known as:  DIOVAN   Take 160 mg by mouth 2 times daily

## 2018-07-12 NOTE — ANESTHESIA CARE TRANSFER NOTE
Patient: Lasha Obando    Procedure(s):  Holmium Laser Enucleation Of The Prostate  - Wound Class: II-Clean Contaminated    Diagnosis: Urinary Retention   Diagnosis Additional Information: No value filed.    Anesthesia Type:   General, ETT, RSI     Note:  Airway :Face Mask  Patient transferred to:PACU  Handoff Report: Identifed the Patient, Identified the Reponsible Provider, Reviewed the pertinent medical history, Discussed the surgical course, Reviewed Intra-OP anesthesia mangement and issues during anesthesia, Set expectations for post-procedure period and Allowed opportunity for questions and acknowledgement of understanding      Vitals: (Last set prior to Anesthesia Care Transfer)    CRNA VITALS  7/12/2018 1457 - 7/12/2018 1535      7/12/2018             Pulse: 82    SpO2: 92 %                Electronically Signed By: ISA Head CRNA  July 12, 2018  3:35 PM

## 2018-07-12 NOTE — BRIEF OP NOTE
[facility] [place]    Brief Operative Note    Pre-operative diagnosis: Urinary Retention   Post-operative diagnosis * No post-op diagnosis entered *  Procedure: Procedure(s):  Holmium Laser Enucleation Of The Prostate  - Wound Class: II-Clean Contaminated  Surgeon: Surgeon(s) and Role:     * Chico Lawrence MD - Primary     * Boo Guzman MD - Resident - Assisting  Anesthesia: General   Estimated blood loss: See anesthesia records  Drains: 24-Fr 3-way catheter on CBI  Specimens:   ID Type Source Tests Collected by Time Destination   1 : Prostate tissue for [employer] Tissue Prostate OR DOCUMENTATION ONLY Chico Lawrence MD 7/12/2018  1:36 PM    A : Prostate Tissue Prostate SURGICAL PATHOLOGY EXAM Chico Lawrence MD 7/12/2018  1:36 PM      Findings:   Enlarged prostate, see op note for full details.  Complications: None.  Implants: None    Plan:   - Admit to observation   - Continue CBI   - TOV in the AM after careful irrigation of bladder and prostatic fossa

## 2018-07-12 NOTE — OR NURSING
"PACU to Inpatient Nursing Handoff    Patient Lasha Obando is a 68 year old male who speaks English.   Procedure Procedure(s):  Holmium Laser Enucleation Of The Prostate  - Wound Class: II-Clean Contaminated   Surgeon(s) Primary: Chico Lawrence MD  Resident - Assisting: Boo Guzman MD     Allergies   Allergen Reactions     Ciprofloxacin Difficulty breathing     Macrobid [Nitrofurantoin] Shortness Of Breath and Palpitations     Amlodipine      Felt like heart stopped     Cephalexin      \"Tripped out\"     Chlorthalidone      Minocycline      Moxifloxacin      Penicillins Unknown     Sulfa Drugs        Isolation  No active isolations     Past Medical History   has a past medical history of Congestive heart failure (H); Gout; Hypertension; Prediabetes; and Umbilical hernia.    Anesthesia General   Dermatome Level     Preop Meds Not applicable   Nerve block Not applicable   Intraop Meds dexamethasone (Decadron)  fentanyl (Sublimaze): 25 mcg total  ondansetron (Zofran): last given at 1508   Local Meds No   Antibiotics gentamicin (Garamycin) - last given at 1320  vancomycin (Vancocin) - last given at 1323     Pain Patient Currently in Pain: denies  Comfort: negligible pain  Pain Control: partially effective   PACU meds  Not applicable   PCA / epidural No   Capnography Respiratory Monitoring (EtCO2): 45 mmHg   Telemetry ECG Rhythm: Normal sinus rhythm   Inpatient Telemetry Monitor Ordered? No        Labs Glucose Lab Results   Component Value Date     07/12/2018       Hgb Lab Results   Component Value Date    HGB 15.1 07/12/2018       INR No results found for: INR   PACU Imaging Not applicable     Wound/Incision     CMS Peripheral Neurovascular WDL: WDL (07/12/18 1630)      Equipment Continuous bladder irrigation   Other LDA       IV Access Peripheral IV 07/12/18 Left Hand (Active)   Site Assessment WDL 7/12/2018  4:30 PM   Line Status Infusing 7/12/2018  4:30 PM   Phlebitis Scale 0-->no symptoms " 7/12/2018  4:30 PM   Infiltration Scale 0 7/12/2018  4:30 PM   Number of days:0      Blood Products Albumin    mL   Intake/Output Date 07/12/18 0700 - 07/13/18 0659   Shift 0332-8055 5160-2727 1431-1556 24 Hour Total   I  N  T  A  K  E   I.V. 1000 200  1200    Colloid 250   250    Shift Total  (mL/kg) 1250  (9.41) 200  (1.5)  1450  (10.91)   O  U  T  P  U  T   Urine  -1250  -1250    Blood  150  150    Shift Total  (mL/kg)  -1100  (-8.28)  -1100  (-8.28)   Weight (kg) 132.9 132.9 132.9 132.9        Drains / [place] Urethral Catheter Non-latex;Straight-tip;Triple-lumen 22 fr (Active)   Collection Container Standard 7/12/2018  4:30 PM   Securement Method Securing device (Describe) 7/12/2018  4:30 PM   Rationale for Continued Use /GI/GYN Pelvic Procedure 7/12/2018  4:30 PM   Number of days:0      Time of void PreOp Void Prior to Procedure: 1230 (07/12/18 1242)    PostOp      Diapered? No   Bladder Scan     PO    not interested     Vitals    B/P: (!) 151/92  T: 97.7  F (36.5  C)    Temp src: Oral  P:       Heart Rate: 71 (07/12/18 1630)     R: 16  O2:  SpO2: 97 %    O2 Device: Nasal cannula (07/12/18 1600)    Oxygen Delivery: 3 LPM (07/12/18 1600)         Family/support present significant other   Patient belongings Patient Belongings: clothing;shoes;dental appliance/dentures (full upper)  Disposition of Belongings: Sent to security per site process   Patient transported on cart and air mat   DC meds/scripts (obs/outpt) Yes, scripts   Inpatient Pain Meds Released? Yes       Special needs/considerations continuous bladder irrigation   Tasks needing completion None       Chidi Conley, RN  ASCOM [id number]

## 2018-07-12 NOTE — PROGRESS NOTES
"SPIRITUAL HEALTH SERVICES  [facility] ([facility]) Pre-op   PRE-SURGERY VISIT    Had pre-surgery visit with pt. Lasha and significant other Anastasia. Lasha expressed feeling calmer than he expected to, and credited his eased state to \"letting it all out\" at God for a day \"cussing and praying a lot.\"  He asked for prayers for an easy procedure and \"speedy recovery,\" which we shared.     Jessica James   Intern  Pager [phone]  "

## 2018-07-12 NOTE — ANESTHESIA PREPROCEDURE EVALUATION
Anesthesia Evaluation     . Pt has had prior anesthetic. Type: General and MAC    No history of anesthetic complications          ROS/MED HX    ENT/Pulmonary:     (+)tobacco use, Past use quit 12 years ago packs/day  , . .    Neurologic:  - neg neurologic ROS     Cardiovascular:     (+) hypertension----. : . CHF etiology: Unknown.  ? inhalation injury from creosite.  Clean coronaries by angiogram Last EF: 25 percent date: 2008 NYHA classification: II. . :. . Previous cardiac testing Echodate:2018results:LVHStress Testdate: results:Negative date: results:Cath date: 2008 results:Coronaries clean by report         (-) MARTÍNEZ   METS/Exercise Tolerance:     Hematologic:  - neg hematologic  ROS       Musculoskeletal: Comment: Gout        GI/Hepatic:  - neg GI/hepatic ROS       Renal/Genitourinary:     (+) BPH,       Endo:  - neg endo ROS       Psychiatric:  - neg psychiatric ROS       Infectious Disease:  - neg infectious disease ROS       Malignancy:      - no malignancy   Other:    - neg other ROS                 Physical Exam  Normal systems: cardiovascular    Airway   Mallampati: II  TM distance: <3 FB  Neck ROM: full    Dental   (+) upper dentures    Cardiovascular       Pulmonary    breath sounds clear to auscultation                    Anesthesia Plan      History & Physical Review  History and physical reviewed and following examination; no interval change.    ASA Status:  3 .    NPO Status:  > 8 hours    Plan for General, ETT and RSI with Intravenous and Propofol induction. Maintenance will be Balanced.    PONV prophylaxis:  Ondansetron (or other 5HT-3) and Dexamethasone or Solumedrol  Additional equipment: Videolaryngoscope      Postoperative Care  Postoperative pain management:  Multi-modal analgesia.      Consents  Anesthetic plan, risks, benefits and alternatives discussed with:  Patient and Spouse.  Use of blood products discussed: No .   .                          .

## 2018-07-12 NOTE — IP AVS SNAPSHOT
[facility]    [address]    [place] [address]    Phone:  [phone]                                       After Visit Summary   7/12/2018    Lasha Obando    MRN: [id number]           After Visit Summary Signature Page     I have received my discharge instructions, and my questions have been answered. I have discussed any challenges I see with this plan with the nurse or doctor.    ..........................................................................................................................................  Patient/Patient Representative Signature      ..........................................................................................................................................  Patient Representative Print Name and Relationship to Patient    ..................................................               ................................................  Date                                            Time    ..........................................................................................................................................  Reviewed by Signature/Title    ...................................................              ..............................................  Date                                                            Time

## 2018-07-12 NOTE — PROGRESS NOTES
UROLOGY FOLLOW-UP NOTE     Lasha Obando is a very pleasant 68 year old male     Brief  History: See Dr. Dawkins note from today for full history.  In summary, has very large prostate and is currently on CIC, referred for consideration of HoLEP.  We had the opportunity to meet today to discuss surgical management of BPH with urinary retention which he is interested in.     After discussion of medical and surgical treatments for BPH including alpha blockers, anticholinergics, transurethral resection, laser ablation, enucleation and simple prostatectomy, Mr. Obando has decided to proceed with Holmium Laser Enucleation of the Prostate (HoLEP).    We discussed the associated risks of this procedure included but not limited to the following:  -Bleeding, potentially significant enough to require clot evacuation and blood transfusion  -Infection, for which we will plan to treat preoperatively based on targeted antibiotic therapy  -Damage to the bladder, urethra and penis including the risk of urethral stricture and bladder neck contracture  -Risk of incidentally discovered prostate cancer.  We discussed that this would not preclude him from further therapy though it could prolong recovery and potentially increase risk of complications associated with cancer treatment.  Further preoperative workup to assess for prostate cancer prior to surgery was offered but patient deferred.  -Risk of retrograde ejaculation which would be expected to occur in the majority if not all men after HoLEP  -Risk of urinary incontinence.  We discussed that in the majority of men this is a transient process that is generally self limited to the first 6-12 weeks after surgery though could take longer to resolve depending on baseline bladder instability.  -Risk of postperative urinary retention though in published series HoLEP has been found to be associated with high success rates of achieving spontaneous voiding even in men with  underactive and atonic bladders.

## 2018-07-13 VITALS
SYSTOLIC BLOOD PRESSURE: 141 MMHG | WEIGHT: 292.99 LBS | HEART RATE: 91 BPM | BODY MASS INDEX: 38.83 KG/M2 | HEIGHT: 73 IN | RESPIRATION RATE: 18 BRPM | OXYGEN SATURATION: 95 % | TEMPERATURE: 97.9 F | DIASTOLIC BLOOD PRESSURE: 67 MMHG

## 2018-07-13 LAB
ANION GAP SERPL CALCULATED.3IONS-SCNC: 8 MMOL/L (ref 3–14)
BUN SERPL-MCNC: 17 MG/DL (ref 7–30)
CALCIUM SERPL-MCNC: 8 MG/DL (ref 8.5–10.1)
CHLORIDE SERPL-SCNC: 107 MMOL/L (ref 94–109)
CO2 SERPL-SCNC: 27 MMOL/L (ref 20–32)
CREAT SERPL-MCNC: 0.92 MG/DL (ref 0.66–1.25)
ERYTHROCYTE [DISTWIDTH] IN BLOOD BY AUTOMATED COUNT: 12.7 % (ref 10–15)
GFR SERPL CREATININE-BSD FRML MDRD: 82 ML/MIN/1.7M2
GLUCOSE SERPL-MCNC: 123 MG/DL (ref 70–99)
HCT VFR BLD AUTO: 41.9 % (ref 40–53)
HGB BLD-MCNC: 13.6 G/DL (ref 13.3–17.7)
MCH RBC QN AUTO: 29.6 PG (ref 26.5–33)
MCHC RBC AUTO-ENTMCNC: 32.5 G/DL (ref 31.5–36.5)
MCV RBC AUTO: 91 FL (ref 78–100)
PLATELET # BLD AUTO: 199 10E9/L (ref 150–450)
POTASSIUM SERPL-SCNC: 4.3 MMOL/L (ref 3.4–5.3)
RBC # BLD AUTO: 4.6 10E12/L (ref 4.4–5.9)
SODIUM SERPL-SCNC: 142 MMOL/L (ref 133–144)
WBC # BLD AUTO: 15.3 10E9/L (ref 4–11)

## 2018-07-13 PROCEDURE — G0378 HOSPITAL OBSERVATION PER HR: HCPCS

## 2018-07-13 PROCEDURE — 25000128 H RX IP 250 OP 636

## 2018-07-13 PROCEDURE — 36415 COLL VENOUS BLD VENIPUNCTURE: CPT | Performed by: UROLOGY

## 2018-07-13 PROCEDURE — 85027 COMPLETE CBC AUTOMATED: CPT | Performed by: UROLOGY

## 2018-07-13 PROCEDURE — 82565 ASSAY OF CREATININE: CPT | Performed by: UROLOGY

## 2018-07-13 PROCEDURE — 80048 BASIC METABOLIC PNL TOTAL CA: CPT | Performed by: UROLOGY

## 2018-07-13 RX ORDER — SODIUM CHLORIDE 9 MG/ML
INJECTION, SOLUTION INTRAVENOUS
Status: COMPLETED
Start: 2018-07-13 | End: 2018-07-13

## 2018-07-13 RX ORDER — DOXYCYCLINE 100 MG/1
100 CAPSULE ORAL 2 TIMES DAILY
Qty: 14 CAPSULE | Refills: 0 | Status: SHIPPED | OUTPATIENT
Start: 2018-07-13 | End: 2018-09-11

## 2018-07-13 RX ADMIN — VANCOMYCIN HYDROCHLORIDE 2000 MG: 10 INJECTION, POWDER, LYOPHILIZED, FOR SOLUTION INTRAVENOUS at 14:17

## 2018-07-13 RX ADMIN — VANCOMYCIN HYDROCHLORIDE 2000 MG: 10 INJECTION, POWDER, LYOPHILIZED, FOR SOLUTION INTRAVENOUS at 05:30

## 2018-07-13 RX ADMIN — SODIUM CHLORIDE 500 ML: 9 INJECTION, SOLUTION INTRAVENOUS at 05:34

## 2018-07-13 ASSESSMENT — ACTIVITIES OF DAILY LIVING (ADL)
RETIRED_COMMUNICATION: 0-->UNDERSTANDS/COMMUNICATES WITHOUT DIFFICULTY
FALL_HISTORY_WITHIN_LAST_SIX_MONTHS: YES
TRANSFERRING: 0-->INDEPENDENT
BATHING: 0-->INDEPENDENT
NUMBER_OF_TIMES_PATIENT_HAS_FALLEN_WITHIN_LAST_SIX_MONTHS: 1
DRESS: 0-->INDEPENDENT
COGNITION: 0 - NO COGNITION ISSUES REPORTED
SWALLOWING: 0-->SWALLOWS FOODS/LIQUIDS WITHOUT DIFFICULTY
AMBULATION: 0-->INDEPENDENT
TOILETING: 0-->INDEPENDENT
RETIRED_EATING: 0-->INDEPENDENT

## 2018-07-13 NOTE — ANESTHESIA POSTPROCEDURE EVALUATION
Patient: Lasha Obando    Procedure(s):  Holmium Laser Enucleation Of The Prostate  - Wound Class: II-Clean Contaminated    Diagnosis:Urinary Retention   Diagnosis Additional Information: No value filed.    Anesthesia Type:  General, ETT, RSI    Note:  Anesthesia Post Evaluation    Patient location during evaluation: PACU  Patient participation: Able to fully participate in evaluation  Level of consciousness: awake and alert  Pain management: adequate  Airway patency: patent  Cardiovascular status: hemodynamically stable (bp cuff was reading hypotensive. Patient asymptommatic. Cuff repositioned and bp measurement near baseline.)  Respiratory status: nasal cannula and spontaneous ventilation  Hydration status: acceptable  PONV: none     Anesthetic complications: None    Comments: Patient comfortable, asks when pain will start. No nausea or questions about anesthesia        Last vitals:  Vitals:    07/12/18 1645 07/12/18 1700 07/12/18 1800   BP: (!) 153/94 (!) 158/93 (!) 168/93   Resp: 16 16 20   Temp:  36.6  C (97.9  F) 35.7  C (96.2  F)   SpO2: 97% 96% 94%         Electronically Signed By: Kimmie Payne MD  July 12, 2018  7:09 PM

## 2018-07-13 NOTE — PLAN OF CARE
Problem: Patient Care Overview  Goal: Plan of Care/Patient Progress Review  Observation goals:    2000  Pain well controlled- denies pain   - No nausea, vomiting - denies   - Urine color improving on slow rate of CBI- fast rate and urine is dark watermelon    2200  Pain well controlled - denies pain  - No nausea, vomiting - denies  - Urine color improving on slow rate of CBI - fast to medium rate and urine is watermelon     0000  Pain well controlled- denies pain  - No nausea, vomiting - denies  - Urine color improving on slow rate of CBI - urine is watermelon color and now rate is medium    0200  Pain well controlled- denies   - No nausea, vomiting - denies  - Urine color improving on slow rate of CBI - slow rate and urine is light watermelon color    0400  Pain well controlled - denies pain  - No nausea, vomiting - denies  - Urine color improving on slow rate of CBI- watermelon color and medium flow     0600  Pain well controlled denies pain  - No nausea, vomiting - denies  - Urine color improving on slow rate of CBI - watermelon color and medium flow    VSS, afebrile, A&Ox4, on 4 LPM - pt has possible SA and snoring and O2 down into 80s, denies pain, reported feeling lightheaded @ 0200 - seems better now, no nausea, reflux, SOB or CP reported, CBI flowing well- no clots noted, pt reluctant to reposition, PIV infusing, pt has been resting appropriately during the night, call light in reach length, able to make needs known, will continue to monitor and assist.

## 2018-07-13 NOTE — PROGRESS NOTES
"Urology  Progress Note    No acute events overnight   Pain well controlled  Has not yet ambulated  On CBI overnight     Exam  /67 (BP Location: Right arm)  Pulse 77  Temp 97.6  F (36.4  C) (Oral)  Resp 16  Ht 1.854 m (6' 1\")  Wt 132.9 kg (292 lb 15.9 oz)  SpO2 96%  BMI 38.66 kg/m2  No acute distress  Unlabored breathing  Abdomen soft, nt/nd  Lower extremities non-edematous bilaterally  CBI hernandez light pink to clear on moderate drip     Labs  WBC 15.3  Hgb 13.6  Cr 0.92    Assessment/Plan  68 year old y/o male POD#1 s/p HoLEP for BPH with obstructive LUTS.     Neuro: Pain well controlled on current regimen  CV: HDS  Pulm: incentive spirometry while awake  FEN/GI: Regular diet, SLIV  Endo: MIGUEL  : Irrigated at bedside this AM with return of moderate clot, urine cleared to light pink Subsequently performed TOV, instilled  Heme/ID: Continued denise-op abx  Activity: Up ad sari  PPx: SCDs  Dispo: Home     Seen and examined with the chief resident. Will discuss with Dr. Lawrence.    Boo Guzman, PGY-3  Urology Resident     Contacting the Urology Team     Please use the following job codes to reach the Urology Team. Note that you must use an in house phone and that job codes cannot receive text pages.     On weekdays, dial [phone] (or star-star-star [phone] on the new [employer] telephones) then [phone] to reach the Adult Urology resident or PA on call    On weekdays, dial [phone] (or star-star-star [phone] on the new [employer] telephones) then [phone] to reach the Pediatric Urology resident    On weeknights and weekends, dial [phone] (or star-star-star [phone] on the new [employer] telephones) then [phone] to reach the Urology resident on call (for both Adult and Pediatrics)          "

## 2018-07-13 NOTE — PLAN OF CARE
Problem: Patient Care Overview  Goal: Plan of Care/Patient Progress Review  Outcome: Improving  OBSERVATION GOALS:  - Pain well controlled  - YES  - No nausea, vomiting  - YES  - Urine color improving on slow rate of CBI - YES, catheter removed this AM by urology team    Pt assessed q2hrs with observation goals. Pt is A/Ox4, calm and cooperative. VSS. LS clear. Catheter removed. Next void output was 150 - . Urology paged and determined him clear for discharge. Pt up and steady on feet. Spouse in room and supportive of cares. Pt received d/c instructions and is waiting for vancomycin to finish infusing before d/c. Denies pain. Call light in reach, able to make needs known.

## 2018-07-14 NOTE — OP NOTE
Operative Report  7/13/2018    PREOPERATIVE DIAGNOSIS:  Prostatic hypertrophy with urinary retention  POSTOPERATIVE DIAGNOSIS: Same as above    PROCEDURE PERFORMED: Holmium laser enucleation of the prostate  ATTENDING SURGEON: Chico Lawrence MD  RESIDENT SURGEON: Boo Guzman MD    FINDINGS: Approximately 200 gm prostate with bilobar hypertrophy. Bladder with severe trabeculation  ANESTHESIA: General.   INTRAVENOUS FLUIDS: See anesthesia records  ESTIMATED BLOOD LOSS: Less than 100 ml   SPECIMENS: Prostate adenoma  DRAINS: 22-Portuguese 3-way catheter with 75 ml in balloon     INDICATIONS FOR PROCEDURE: Lasha Obando is a(n) 68 year old male who was seen in consultation for urinary retention. He had failed optimal medical therapy. Prostate volume estimated to be 200 grams. His digital rectal exam was negative for any nodules. His most recent PSA is in the 4's. After discussion of the risks, benefits and alternatives of the procedure, the patient agreed to proceed with the above stated procdure.    DESCRIPTION OF PROCEDURE: After obtaining informed consent, the patient was taken to the operating room and placed under general anesthesia.  He was repositioned in dorsal lithotomy making sure that the legs were positioned and padded safely.  He was then prepped and draped in standard sterile fashion.  Culture sensitive antibiotics were administered and bilateral sequential compression devices were placed.  A time out was performed confirming the appropriate patient identity and planned procedure.     The procedure was begun by generously lubricating the urethra.  We then sequentially dilated the urethra using the hansel sounds from 22-28F.  The urethra was noted to be snug at 28F so we elected to use a 26F outer sheath.  The outer sheath was placed over a deflecting obturator and we then looked the scope through the posterior urethra and into the bladder.  The prostate was noted to have a bilobar configuration.  We  inspected the bladder noting that it had severe trabeculation.  At this point we inserted the 550 micron holmium laser through the 7F laser catheter.    We began enucleation by making a 6  o'clock incision.  We carried this incision down to the prostatic capsule from the bladder neck towards the apex just proximal to the veromontanum.  We then proceeded with a bottom up  approach, electing to enucleate the left lobe first.  We performed the majority of the dissection at 40 duckworth making sure to keep the energy at 40 duckworth or lower when working near the apex.  The capsular planes on this side were noted to be good.  Once the majority of the lobe was dissected we isolated the mucosal strip and transected it with the laser at 40 duckworth.  We then proceeded to take down the remaining lateral and posterior attachments and pushed the lobe into the bladder.  We then enucleated the contralateral lobe using a top down approach.  Capsular planes were noted to be good on this side.  The remaining bladder neck attachments were identified and transected, freeing the lobe up entirely. Total enucleation time was 56 minutes.    At this point there was a moderate amount of bleeding and approximately 5 minutes were spent identifying bleeding vessels in the fossa and coagulating them with the laser.  Once hemostasis was adequate we switched from the laser resectoscope to the 26F offset telescope with the Piranha morcellation device.  We added an extra inflow to distend the bladder.  The blades were adjusted and set at a rate of 1500 RPM.  We proceeded with morcellation making sure that we morcellated the entirety of the adenoma.  Total morcellation time was 27 minutes.     We then switched back to the laser resectoscope one final time to ensure that no residual tissue was left in the bladder.  We also confirmed that the bladder was unharmed from morcellation and that both ureteral orifices were unharmed during the procedure.  We  inspected the fossa and obtained final hemostasis.  We did not elect to perform a bladder neck incision at 6 o'clock as the prostate was noted to be large. We looked the scope out noting that the sphincter was completely intact without evidence of thermal or mechanical injury.     We lubricated the urethra again and passed a 22F 3-way hernandez catheter over a catheter guide.  The urine was noted to be pink.  We filled the balloon with 75 ml of sterile water and did not place the catheter on traction.  The patient was woken from anesthesia and taken to the recovery room in stable condition.     The specimen was weighed and found to be approximately 230 g.     POSTOPERATIVE PLAN:   -We will monitor the patient post operatively on continuous bladder irrigation for signs of ongling bleeding.  If clear we will consider discharge with hernandez removal as an outpatient.  If continues to have ongoing bleeding concerning for clot formation without bladder irrigation will plan to admit for observation    Chico Lawrence

## 2018-07-16 ENCOUNTER — CARE COORDINATION (OUTPATIENT)
Dept: UROLOGY | Facility: CLINIC | Age: 68
End: 2018-07-16

## 2018-07-16 NOTE — PROGRESS NOTES
Lasha Obando,    How are you doing after your surgical procedure on last Thursday?    Any fever, chills, nausea or vomiting? No   How is your appetite? good  Are you drinking enough fluids? yes  Have you had a bowel movement since you have been home? yes  Are you having any difficulties with urination? no  Any problems with your catheter? NA  Is your urine clear yellow? Watermelon   How does your incision look? NA I  I  How is your pain? Denies any pain.         Do you have any questions or concerns? No    Patient told to follow up in September.    Please feel free to reply via [employer] or contact the clinic.  [phone], option #3 to speak to the nurse

## 2018-07-17 ENCOUNTER — TELEPHONE (OUTPATIENT)
Dept: UROLOGY | Facility: CLINIC | Age: 68
End: 2018-07-17

## 2018-07-17 LAB — COPATH REPORT: NORMAL

## 2018-08-24 ENCOUNTER — PRE VISIT (OUTPATIENT)
Dept: UROLOGY | Facility: CLINIC | Age: 68
End: 2018-08-24

## 2018-09-11 ENCOUNTER — PATIENT OUTREACH (OUTPATIENT)
Dept: CARE COORDINATION | Facility: CLINIC | Age: 68
End: 2018-09-11

## 2018-09-11 ENCOUNTER — OFFICE VISIT (OUTPATIENT)
Dept: UROLOGY | Facility: CLINIC | Age: 68
End: 2018-09-11

## 2018-09-11 VITALS
WEIGHT: 296.1 LBS | HEART RATE: 106 BPM | HEIGHT: 73 IN | SYSTOLIC BLOOD PRESSURE: 152 MMHG | BODY MASS INDEX: 39.24 KG/M2 | DIASTOLIC BLOOD PRESSURE: 86 MMHG

## 2018-09-11 DIAGNOSIS — R97.20 ELEVATED PSA: Primary | ICD-10-CM

## 2018-09-11 DIAGNOSIS — R97.20 ELEVATED PSA: ICD-10-CM

## 2018-09-11 LAB — PSA SERPL-MCNC: 0.23 UG/L (ref 0–4)

## 2018-09-11 RX ORDER — CIPROFLOXACIN 500 MG/1
TABLET, FILM COATED ORAL
Refills: 0 | COMMUNITY
Start: 2018-07-03 | End: 2018-09-11

## 2018-09-11 RX ORDER — NITROFURANTOIN 25; 75 MG/1; MG/1
CAPSULE ORAL
Refills: 0 | COMMUNITY
Start: 2018-07-09 | End: 2018-09-11

## 2018-09-11 ASSESSMENT — PAIN SCALES - GENERAL: PAINLEVEL: NO PAIN (0)

## 2018-09-11 NOTE — PROGRESS NOTES
HoLEP 8 Week Follow-Up Note    Today I had the pleasure of seeing Mr. Obando in follow-up for a history of BPH with urinary retention.     He underwent holmium laser enucleation of the prostate approximately 8 weeks ago     157.5 gms of tissue were removed.  Pathology was benign.     His recovery thus far has been excellent.     Today he notes he is feeling great and he is quite satisfied with his voiding. He reports strong stream and good urinary control. He does report rare stress incontinence and wears pads for safety; however he is not leaking significantly, just a small drip here and there when lifting heavy things like machinery or audio equipment. He has been gradually returning back to his usual activities.     AUA Symptom Score is 4/35 with a 0/6 for bother    Uroflow/Post Void Residual  PVR 28 mL    Assessment/Plan - 68 year old male 8 weeks status post HoLEP  - PSA today  - Follow up PRN, recommend annual PSA check with PCP      >15 minutes were spent face to face with patient over half of which was spent providing medical counseling regarding anticipated postop recovery following HoLEP.     I, Chico Lawrence saw and evaluated this patient and agree with the plan as stated above.  I personally performed all listed procedures.    Scribe Disclosure:   I,Ignacio Schaefer, am serving as a scribe; to document services personally performed by Chico Lawrence MD based on data collection and the provider's statements to me.     Answers for HPI/ROS submitted by the patient on 9/11/2018   PHQ-2 Score: 0

## 2018-09-11 NOTE — LETTER
9/11/2018       RE: Lasha Obando  [address] [facility] [address]     Dear Colleague,    Thank you for referring your patient, Lasha Obando, to the [facility] UROLOGY AND INST FOR PROSTATE AND UROLOGIC CANCERS at [facility]. Please see a copy of my visit note below.    HoLEP 8 Week Follow-Up Note    Today I had the pleasure of seeing Mr. Obando in follow-up for a history of BPH with urinary retention.     He underwent holmium laser enucleation of the prostate approximately 8 weeks ago     157.5 gms of tissue were removed.  Pathology was benign.     His recovery thus far has been excellent.     Today he notes he is feeling great and he is quite satisfied with his voiding. He reports strong stream and good urinary control. He does report rare stress incontinence and wears pads for safety; however he is not leaking significantly, just a small drip here and there when lifting heavy things like machinery or audio equipment. He has been gradually returning back to his usual activities.     AUA Symptom Score is 4/35 with a 0/6 for bother    Uroflow/Post Void Residual  PVR 28 mL    Assessment/Plan - 68 year old male 8 weeks status post HoLEP  - PSA today  - Follow up PRN, recommend annual PSA check with PCP      >15 minutes were spent face to face with patient over half of which was spent providing medical counseling regarding anticipated postop recovery following HoLEP.     IChico saw and evaluated this patient and agree with the plan as stated above.  I personally performed all listed procedures.    Scribe Disclosure:   I,Ignacio Schaefer, am serving as a scribe; to document services personally performed by Chico Lawrence MD based on data collection and the provider's statements to me.     Again, thank you for allowing me to participate in the care of your patient.      Sincerely,    Chico Lawrence MD

## 2018-09-11 NOTE — MR AVS SNAPSHOT
After Visit Summary   9/11/2018    Lasha Obando    MRN: [id number]           Patient Information     Date Of Birth          1950        Visit Information        Provider Department      9/11/2018 9:00 AM Chico Lawrence MD [facility] Urology and [facility] Prostate and Urologic C[facility]        Today's Diagnoses     Elevated PSA    -  1      Care Instructions    Check PSA today.  Dr. Lawrence will notify you of the results.    Follow up as needed.    It was a pleasure meeting with you today.  Thank you for allowing me and my team the privilege of caring for you today.  YOU are the reason we are here, and I truly hope we provided you with the excellent service you deserve.  Please let us know if there is anything else we can do for you so that we can be sure you are leaving completely satisfied with your care experience.        Colette Wang THERESA          Follow-ups after your visit        Future tests that were ordered for you today     Open Future Orders        Priority Expected Expires Ordered    PSA tumor marker Routine  9/11/2019 9/11/2018            Who to contact     Please call your clinic at [phone] to:    Ask questions about your health    Make or cancel appointments    Discuss your medicines    Learn about your test results    Speak to your doctor            Additional Information About Your Visit        [employer] [employer] Information     [employer] gives you secure access to your electronic health record. If you see a primary care provider, you can also send messages to your care team and make appointments. If you have questions, please call your primary care clinic.  If you do not have a primary care provider, please call [phone] and they will assist you.      [employer] is an electronic gateway that provides easy, online access to your medical records. With [employer], you can request a clinic appointment, read your test results, renew a prescription or communicate with your care team.    "  To access your existing account, please contact your [facility] or call [phone] for assistance.        Care EveryWhere ID     This is your Care EveryWhere ID. This could be used by other organizations to access your [facility] medical records  [id number]        Your Vitals Were     Pulse Height BMI (Body Mass Index)             106 1.854 m (6' 1\") 39.07 kg/m2          Blood Pressure from Last 3 Encounters:   09/11/18 152/86   07/13/18 141/67   06/26/18 (!) 171/95    Weight from Last 3 Encounters:   09/11/18 134.3 kg (296 lb 1.6 oz)   07/12/18 132.9 kg (292 lb 15.9 oz)   06/26/18 131.5 kg (290 lb)              We Performed the Following     POST-VOID RESIDUAL BLADDER SCAN        Primary Care Provider Office Phone # Fax #    Tosha RC Cota APRN [phone] [address]  [facility] [address]        Equal Access to Services     [facility] : Hadii aad ku hadasho Soomaali, waaxda luqadaha, qaybta kaalmada adeegyada, waxay idiin haydennis sow . So [facility] [phone].    ATENCIÓN: Si habla español, tiene a hnut disposición servicios gratuitos de asistencia lingüística. Llame al [phone].    We comply with applicable federal civil rights laws and [place] laws. We do not discriminate on the basis of race, color, national origin, age, disability, sex, sexual orientation, or gender identity.            Thank you!     Thank you for choosing [facility] UROLOGY AND [facility] FOR PROSTATE AND UROLOGIC CANCERS  for your care. Our goal is always to provide you with excellent care. Hearing back from our patients is one way we can continue to improve our services. Please take a few minutes to complete the written survey that you may receive in the mail after your visit with us. Thank you!             Your Updated Medication List - Protect others around you: Learn how to safely use, store and throw away your medicines at www.disposemymeds.org.          This " list is accurate as of 9/11/18  9:51 AM.  Always use your most recent med list.                   Brand Name Dispense Instructions for use Diagnosis    carvedilol 25 MG tablet    COREG     Take 25 mg by mouth 2 times daily (with meals)        hydrALAZINE 25 MG tablet    APRESOLINE     Take 25 mg by mouth 3 times daily        valsartan 160 MG tablet    DIOVAN     Take 160 mg by mouth 2 times daily

## 2018-09-11 NOTE — PATIENT INSTRUCTIONS
Check PSA today.  Dr. Lawrence will notify you of the results.    Follow up as needed.    It was a pleasure meeting with you today.  Thank you for allowing me and my team the privilege of caring for you today.  YOU are the reason we are here, and I truly hope we provided you with the excellent service you deserve.  Please let us know if there is anything else we can do for you so that we can be sure you are leaving completely satisfied with your care experience.        JAYDEN Garcia

## 2019-10-03 ENCOUNTER — HEALTH MAINTENANCE LETTER (OUTPATIENT)
Age: 69
End: 2019-10-03

## 2019-12-16 ENCOUNTER — HEALTH MAINTENANCE LETTER (OUTPATIENT)
Age: 69
End: 2019-12-16

## 2021-01-15 ENCOUNTER — HEALTH MAINTENANCE LETTER (OUTPATIENT)
Age: 71
End: 2021-01-15

## 2021-09-05 ENCOUNTER — HEALTH MAINTENANCE LETTER (OUTPATIENT)
Age: 71
End: 2021-09-05

## 2022-02-20 ENCOUNTER — HEALTH MAINTENANCE LETTER (OUTPATIENT)
Age: 72
End: 2022-02-20

## 2022-10-23 ENCOUNTER — HEALTH MAINTENANCE LETTER (OUTPATIENT)
Age: 72
End: 2022-10-23

## 2023-04-02 ENCOUNTER — HEALTH MAINTENANCE LETTER (OUTPATIENT)
Age: 73
End: 2023-04-02

## (undated) DEVICE — LINEN TOWEL PACK X5 5464

## (undated) DEVICE — BAG URINARY DRAIN LUBRISIL IC 4000ML LF 253509A

## (undated) DEVICE — LINEN GOWN X4 5410

## (undated) DEVICE — GLOVE PROTEXIS W/NEU-THERA 7.5  2D73TE75

## (undated) DEVICE — SYR 50ML LL W/O NDL 309653

## (undated) DEVICE — SYR 70ML TOOMEY 041170

## (undated) DEVICE — SUCTION MANIFOLD DORNOCH ULTRA CART UL-CL500

## (undated) DEVICE — TAPE CLOTH 3" CARDINAL 3TRCL03

## (undated) DEVICE — CATH LASER URETERAL 7.1FRX40CM G17797  022403-7.1-40

## (undated) DEVICE — FILTER PIRANHA DISP 2228.901

## (undated) DEVICE — CATH FOLEY 3WAY 22FR 30ML SIL 73022SI

## (undated) DEVICE — SOL NACL 0.9% IRRIG 3000ML BAG 2B7477

## (undated) DEVICE — DRAPE MAYO STAND 23X54 8337

## (undated) DEVICE — JELLY LUBRICATING SURGILUBE 2OZ TUBE 0281-0205-02

## (undated) DEVICE — TUBING SET PIRANHA 41702208

## (undated) DEVICE — STRAP KNEE/BODY 31143004

## (undated) DEVICE — TUBING IRRIG CYSTO/BLADDER SET 81" LF 2C4040

## (undated) DEVICE — SOL WATER IRRIG 1000ML BOTTLE 2F7114

## (undated) DEVICE — Device

## (undated) DEVICE — PAD CHUX UNDERPAD 30X36" P3036C

## (undated) DEVICE — SOL NACL 0.9% IRRIG 1000ML BOTTLE 2F7124

## (undated) DEVICE — LASER FIBER HOLMIUM END FIRE SLIMLINE EZ550 M0068408940

## (undated) DEVICE — SPECIMEN TRAP TISSUE CONTAINER PIRANHA 2208120

## (undated) DEVICE — TUBING SUCTION MEDI-VAC 1/4"X20' N620A

## (undated) DEVICE — BLADE MORCELLATOR WOLF PIRANHA 4.75X385MM 49700103

## (undated) RX ORDER — FENTANYL CITRATE 50 UG/ML
INJECTION, SOLUTION INTRAMUSCULAR; INTRAVENOUS
Status: DISPENSED
Start: 2018-07-12

## (undated) RX ORDER — PHENYLEPHRINE HCL IN 0.9% NACL 1 MG/10 ML
SYRINGE (ML) INTRAVENOUS
Status: DISPENSED
Start: 2018-07-12

## (undated) RX ORDER — ONDANSETRON 2 MG/ML
INJECTION INTRAMUSCULAR; INTRAVENOUS
Status: DISPENSED
Start: 2018-07-12

## (undated) RX ORDER — EPHEDRINE SULFATE 50 MG/ML
INJECTION, SOLUTION INTRAMUSCULAR; INTRAVENOUS; SUBCUTANEOUS
Status: DISPENSED
Start: 2018-07-12

## (undated) RX ORDER — PROPOFOL 10 MG/ML
INJECTION, EMULSION INTRAVENOUS
Status: DISPENSED
Start: 2018-07-12

## (undated) RX ORDER — DEXAMETHASONE SODIUM PHOSPHATE 4 MG/ML
INJECTION, SOLUTION INTRA-ARTICULAR; INTRALESIONAL; INTRAMUSCULAR; INTRAVENOUS; SOFT TISSUE
Status: DISPENSED
Start: 2018-07-12

## (undated) RX ORDER — LIDOCAINE HYDROCHLORIDE 20 MG/ML
INJECTION, SOLUTION EPIDURAL; INFILTRATION; INTRACAUDAL; PERINEURAL
Status: DISPENSED
Start: 2018-07-12